# Patient Record
Sex: FEMALE | Race: WHITE | URBAN - METROPOLITAN AREA
[De-identification: names, ages, dates, MRNs, and addresses within clinical notes are randomized per-mention and may not be internally consistent; named-entity substitution may affect disease eponyms.]

---

## 2020-08-27 ENCOUNTER — OFFICE VISIT (OUTPATIENT)
Dept: URBAN - METROPOLITAN AREA CLINIC 25 | Facility: CLINIC | Age: 50
End: 2020-08-27
Payer: COMMERCIAL

## 2020-08-27 DIAGNOSIS — R11.0 NAUSEA: ICD-10-CM

## 2020-08-27 DIAGNOSIS — R10.13 EPIGASTRIC PAIN: ICD-10-CM

## 2020-08-27 PROCEDURE — G9903 PT SCRN TBCO ID AS NON USER: HCPCS | Performed by: INTERNAL MEDICINE

## 2020-08-27 PROCEDURE — 99204 OFFICE O/P NEW MOD 45 MIN: CPT | Performed by: INTERNAL MEDICINE

## 2020-08-27 PROCEDURE — G8420 CALC BMI NORM PARAMETERS: HCPCS | Performed by: INTERNAL MEDICINE

## 2020-08-27 PROCEDURE — 3017F COLORECTAL CA SCREEN DOC REV: CPT | Performed by: INTERNAL MEDICINE

## 2020-08-27 PROCEDURE — G8427 DOCREV CUR MEDS BY ELIG CLIN: HCPCS | Performed by: INTERNAL MEDICINE

## 2020-08-27 NOTE — HPI-TODAY'S VISIT:
This is a 51 yo female with history of asthma who presents with c/o of epigastric pain x 1 month. Pt mid-sternal and epigastric pain x 1 month. She states pain occassionally radiates to back at times. She reports anoerexia and nausea. Denies vomiting. She report 5 lb weight loss in 1 month. She has been using pepcid and gas-x which helps. Denies frequent NSAID use. Denies dysphagia, odyophagia.  No prior EGD or colonoscopy. No recent CT. She saw cardiology and told unremarkable EKG. She also had MRI of back - awaiting results. No family hx of GI malignancies. She voices no other concerns at this time.

## 2020-09-02 ENCOUNTER — TELEPHONE ENCOUNTER (OUTPATIENT)
Dept: URBAN - METROPOLITAN AREA CLINIC 92 | Facility: CLINIC | Age: 50
End: 2020-09-02

## 2020-09-02 ENCOUNTER — OFFICE VISIT (OUTPATIENT)
Dept: URBAN - METROPOLITAN AREA SURGERY CENTER 20 | Facility: SURGERY CENTER | Age: 50
End: 2020-09-02
Payer: COMMERCIAL

## 2020-09-02 ENCOUNTER — CLAIMS CREATED FROM THE CLAIM WINDOW (OUTPATIENT)
Dept: URBAN - METROPOLITAN AREA CLINIC 4 | Facility: CLINIC | Age: 50
End: 2020-09-02
Payer: COMMERCIAL

## 2020-09-02 DIAGNOSIS — R07.89 ACUTE CHEST WALL PAIN: ICD-10-CM

## 2020-09-02 DIAGNOSIS — R10.13 ABDOMINAL DISCOMFORT, EPIGASTRIC: ICD-10-CM

## 2020-09-02 DIAGNOSIS — R11.0 FUNCTIONAL NAUSEA: ICD-10-CM

## 2020-09-02 DIAGNOSIS — K22.8 OTHER SPECIFIED DISEASES OF ESOPHAGUS: ICD-10-CM

## 2020-09-02 PROCEDURE — 43239 EGD BIOPSY SINGLE/MULTIPLE: CPT | Performed by: INTERNAL MEDICINE

## 2020-09-02 PROCEDURE — G8907 PT DOC NO EVENTS ON DISCHARG: HCPCS | Performed by: INTERNAL MEDICINE

## 2020-09-02 PROCEDURE — 88305 TISSUE EXAM BY PATHOLOGIST: CPT | Performed by: PATHOLOGY

## 2020-09-02 RX ORDER — CYPROHEPTADINE HYDROCHLORIDE 4 MG/1
1 TABLET TABLET ORAL ONCE A DAY
Qty: 30 TABLET | Refills: 2 | OUTPATIENT
Start: 2020-09-02

## 2020-09-03 ENCOUNTER — TELEPHONE ENCOUNTER (OUTPATIENT)
Dept: URBAN - METROPOLITAN AREA CLINIC 84 | Facility: CLINIC | Age: 50
End: 2020-09-03

## 2020-09-04 ENCOUNTER — TELEPHONE ENCOUNTER (OUTPATIENT)
Dept: URBAN - METROPOLITAN AREA CLINIC 84 | Facility: CLINIC | Age: 50
End: 2020-09-04

## 2020-09-09 ENCOUNTER — WEB ENCOUNTER (OUTPATIENT)
Dept: URBAN - METROPOLITAN AREA CLINIC 25 | Facility: CLINIC | Age: 50
End: 2020-09-09

## 2020-09-11 ENCOUNTER — TELEPHONE ENCOUNTER (OUTPATIENT)
Dept: URBAN - METROPOLITAN AREA CLINIC 84 | Facility: CLINIC | Age: 50
End: 2020-09-11

## 2020-09-25 ENCOUNTER — OFFICE VISIT (OUTPATIENT)
Dept: URBAN - METROPOLITAN AREA CLINIC 84 | Facility: CLINIC | Age: 50
End: 2020-09-25
Payer: COMMERCIAL

## 2020-09-25 DIAGNOSIS — R10.11 ABDOMINAL PAIN: ICD-10-CM

## 2020-09-25 DIAGNOSIS — R14.1 BLOATING AND GAS PAIN: ICD-10-CM

## 2020-09-25 DIAGNOSIS — F45.8 GLOBUS SENSATION: ICD-10-CM

## 2020-09-25 PROCEDURE — G8420 CALC BMI NORM PARAMETERS: HCPCS | Performed by: INTERNAL MEDICINE

## 2020-09-25 PROCEDURE — 3017F COLORECTAL CA SCREEN DOC REV: CPT | Performed by: INTERNAL MEDICINE

## 2020-09-25 PROCEDURE — 99213 OFFICE O/P EST LOW 20 MIN: CPT | Performed by: INTERNAL MEDICINE

## 2020-09-25 PROCEDURE — 1036F TOBACCO NON-USER: CPT | Performed by: INTERNAL MEDICINE

## 2020-09-25 PROCEDURE — G8427 DOCREV CUR MEDS BY ELIG CLIN: HCPCS | Performed by: INTERNAL MEDICINE

## 2020-09-25 RX ORDER — POLYETHYLENE GLYCOL 3350 17 G/17G
AS DIRECTED POWDER, FOR SOLUTION ORAL ONCE A DAY
Qty: 238 GRAM | Refills: 0 | OUTPATIENT
Start: 2020-09-25

## 2020-09-25 RX ORDER — SIMETHICONE 250 MG/1
1 CAPSULE AFTER MEALS AND AT BEDTIME AS NEEDED CAPSULE, GELATIN COATED ORAL TWICE A DAY
Qty: 60 CAPSULE | Refills: 3 | OUTPATIENT
Start: 2020-09-25

## 2020-09-25 RX ORDER — HYOSCYAMINE SULFATE 0.12 MG/1
TAKE 1 TABLET (0.125 MG) BY ORAL ROUTE 3 TIMES PER DAY FOR 30 DAYS TABLET ORAL
Qty: 90 | Refills: 3 | OUTPATIENT

## 2020-09-25 RX ORDER — CYPROHEPTADINE HYDROCHLORIDE 4 MG/1
1 TABLET TABLET ORAL ONCE A DAY
Qty: 30 TABLET | Refills: 2 | Status: ACTIVE | COMMUNITY
Start: 2020-09-02

## 2020-09-25 NOTE — HPI-TODAY'S VISIT:
Patient present for f/u with new sxs of epigastric pain and bloating/gas and globus sensation with abdominal discomfort. Her ecent MRI revealed "pinched" nerve in the cervical spine per pt. Patient stated cyproheptadine has been helpful for weight gain and appetite. Patient denies etoh use and has hx of gallbladder removal. She stated she had labs recently with her primary MD and no mention of liver or pancreas issues.

## 2020-09-29 ENCOUNTER — TELEPHONE ENCOUNTER (OUTPATIENT)
Dept: URBAN - METROPOLITAN AREA CLINIC 84 | Facility: CLINIC | Age: 50
End: 2020-09-29

## 2020-10-06 ENCOUNTER — TELEPHONE ENCOUNTER (OUTPATIENT)
Dept: URBAN - METROPOLITAN AREA CLINIC 92 | Facility: CLINIC | Age: 50
End: 2020-10-06

## 2020-10-06 ENCOUNTER — WEB ENCOUNTER (OUTPATIENT)
Dept: URBAN - METROPOLITAN AREA CLINIC 84 | Facility: CLINIC | Age: 50
End: 2020-10-06

## 2020-10-19 ENCOUNTER — TELEPHONE ENCOUNTER (OUTPATIENT)
Dept: URBAN - METROPOLITAN AREA CLINIC 84 | Facility: CLINIC | Age: 50
End: 2020-10-19

## 2020-11-03 ENCOUNTER — OFFICE VISIT (OUTPATIENT)
Dept: URBAN - METROPOLITAN AREA CLINIC 25 | Facility: CLINIC | Age: 50
End: 2020-11-03
Payer: COMMERCIAL

## 2020-11-03 DIAGNOSIS — R09.89 GLOBUS PHARYNGEUS: ICD-10-CM

## 2020-11-03 DIAGNOSIS — R13.10 DYSPHAGIA: ICD-10-CM

## 2020-11-03 PROCEDURE — 1036F TOBACCO NON-USER: CPT | Performed by: INTERNAL MEDICINE

## 2020-11-03 PROCEDURE — G8427 DOCREV CUR MEDS BY ELIG CLIN: HCPCS | Performed by: INTERNAL MEDICINE

## 2020-11-03 PROCEDURE — 3017F COLORECTAL CA SCREEN DOC REV: CPT | Performed by: INTERNAL MEDICINE

## 2020-11-03 PROCEDURE — G8418 CALC BMI BLW LOW PARAM F/U: HCPCS | Performed by: INTERNAL MEDICINE

## 2020-11-03 PROCEDURE — 99214 OFFICE O/P EST MOD 30 MIN: CPT | Performed by: INTERNAL MEDICINE

## 2020-11-03 RX ORDER — HYOSCYAMINE SULFATE 0.12 MG/1
TAKE 1 TABLET (0.125 MG) BY ORAL ROUTE 3 TIMES PER DAY FOR 30 DAYS TABLET ORAL
Qty: 90 | Refills: 3 | Status: ACTIVE | COMMUNITY

## 2020-11-03 RX ORDER — POLYETHYLENE GLYCOL 3350 17 G/17G
AS DIRECTED POWDER, FOR SOLUTION ORAL ONCE A DAY
Qty: 238 GRAM | Refills: 0 | Status: ACTIVE | COMMUNITY
Start: 2020-09-25

## 2020-11-03 RX ORDER — CYPROHEPTADINE HYDROCHLORIDE 4 MG/1
1 TABLET TABLET ORAL ONCE A DAY
Qty: 30 TABLET | Refills: 2 | Status: ACTIVE | COMMUNITY
Start: 2020-09-02

## 2020-11-03 RX ORDER — SIMETHICONE 250 MG/1
1 CAPSULE AFTER MEALS AND AT BEDTIME AS NEEDED CAPSULE, GELATIN COATED ORAL TWICE A DAY
Qty: 60 CAPSULE | Refills: 3 | Status: ACTIVE | COMMUNITY
Start: 2020-09-25

## 2020-11-03 RX ORDER — HYOSCYAMINE SULFATE 0.12 MG/1
TAKE 1 TABLET (0.125 MG) BY ORAL ROUTE 3 TIMES PER DAY FOR 30 DAYS TABLET ORAL
Qty: 90 | Refills: 3

## 2020-11-03 NOTE — HPI-TODAY'S VISIT:
Patient present for f/u globus/dysphagia s/p normal egd. Pt nonetheless continues to c/o of ongoing sxs.

## 2020-12-11 ENCOUNTER — OFFICE VISIT (OUTPATIENT)
Dept: URBAN - METROPOLITAN AREA MEDICAL CENTER 28 | Facility: MEDICAL CENTER | Age: 50
End: 2020-12-11

## 2020-12-31 ENCOUNTER — OFFICE VISIT (OUTPATIENT)
Dept: URBAN - METROPOLITAN AREA CLINIC 25 | Facility: CLINIC | Age: 50
End: 2020-12-31

## 2021-01-25 ENCOUNTER — OFFICE VISIT (OUTPATIENT)
Dept: URBAN - METROPOLITAN AREA CLINIC 84 | Facility: CLINIC | Age: 51
End: 2021-01-25

## 2021-05-28 ENCOUNTER — OFFICE VISIT (OUTPATIENT)
Dept: URBAN - METROPOLITAN AREA CLINIC 84 | Facility: CLINIC | Age: 51
End: 2021-05-28
Payer: COMMERCIAL

## 2021-05-28 ENCOUNTER — WEB ENCOUNTER (OUTPATIENT)
Dept: URBAN - METROPOLITAN AREA CLINIC 84 | Facility: CLINIC | Age: 51
End: 2021-05-28

## 2021-05-28 VITALS
HEIGHT: 61 IN | WEIGHT: 116 LBS | BODY MASS INDEX: 21.9 KG/M2 | DIASTOLIC BLOOD PRESSURE: 66 MMHG | HEART RATE: 81 BPM | TEMPERATURE: 98 F | SYSTOLIC BLOOD PRESSURE: 110 MMHG

## 2021-05-28 DIAGNOSIS — R14.1 BLOATING AND GAS PAIN: ICD-10-CM

## 2021-05-28 DIAGNOSIS — R10.11 ABDOMINAL PAIN: ICD-10-CM

## 2021-05-28 DIAGNOSIS — F45.8 GLOBUS SENSATION: ICD-10-CM

## 2021-05-28 DIAGNOSIS — R13.10 DYSPHAGIA: ICD-10-CM

## 2021-05-28 DIAGNOSIS — K44.9 HIATAL HERNIA: ICD-10-CM

## 2021-05-28 PROBLEM — 40739000 DYSPHAGIA: Status: ACTIVE | Noted: 2020-11-03

## 2021-05-28 PROCEDURE — 99213 OFFICE O/P EST LOW 20 MIN: CPT | Performed by: INTERNAL MEDICINE

## 2021-05-28 RX ORDER — HYOSCYAMINE SULFATE 0.12 MG/1
TAKE 1 TABLET (0.125 MG) BY ORAL ROUTE 3 TIMES PER DAY FOR 30 DAYS TABLET ORAL
Qty: 90 | Refills: 3 | Status: ACTIVE | COMMUNITY

## 2021-05-28 RX ORDER — HYOSCYAMINE SULFATE 0.38 MG/1
1 TABLET TABLET ORAL
Qty: 60 | OUTPATIENT
Start: 2021-05-28 | End: 2021-06-27

## 2021-05-28 RX ORDER — POLYETHYLENE GLYCOL 3350 17 G/17G
AS DIRECTED POWDER, FOR SOLUTION ORAL ONCE A DAY
Qty: 238 GRAM | Refills: 0 | Status: ACTIVE | COMMUNITY
Start: 2020-09-25

## 2021-05-28 RX ORDER — CYPROHEPTADINE HYDROCHLORIDE 4 MG/1
1 TABLET TABLET ORAL ONCE A DAY
Qty: 30 TABLET | Refills: 2 | Status: ACTIVE | COMMUNITY
Start: 2020-09-02

## 2021-05-28 RX ORDER — SIMETHICONE 250 MG/1
1 CAPSULE AFTER MEALS AND AT BEDTIME AS NEEDED CAPSULE, GELATIN COATED ORAL TWICE A DAY
Qty: 60 CAPSULE | Refills: 3 | Status: ACTIVE | COMMUNITY
Start: 2020-09-25

## 2021-05-28 NOTE — HPI-TODAY'S VISIT:
52 yo pt present for f/u of globus which has improved. She could not tolerate the esophageal manometry. Review of barium swallow showed moderate hiatal hernia with reflux. Sxs have improved. CT scan recently was normal. Evaluation by neurologist has been unremarkable by pt. She states that a recent colonoscopy has been normal.

## 2021-06-08 ENCOUNTER — TELEPHONE ENCOUNTER (OUTPATIENT)
Dept: URBAN - METROPOLITAN AREA CLINIC 92 | Facility: CLINIC | Age: 51
End: 2021-06-08

## 2021-06-08 RX ORDER — CYPROHEPTADINE HYDROCHLORIDE 4 MG/1
1 TABLET TABLET ORAL ONCE A DAY
Qty: 30 TABLET | Refills: 2 | Status: ACTIVE | COMMUNITY
Start: 2020-09-02

## 2021-06-08 RX ORDER — DICYCLOMINE HYDROCHLORIDE 20 MG/1
TAKE 1 CAPSULE (10 MG) BY ORAL ROUTE 3 TIMES PER DAY FOR 30 DAYS TABLET ORAL
Qty: 180 CAPSULE | Refills: 2 | OUTPATIENT

## 2021-06-08 RX ORDER — HYOSCYAMINE SULFATE 0.38 MG/1
1 TABLET TABLET ORAL
OUTPATIENT
Start: 2021-05-28 | End: 2021-06-27

## 2021-06-08 RX ORDER — POLYETHYLENE GLYCOL 3350 17 G/17G
AS DIRECTED POWDER, FOR SOLUTION ORAL ONCE A DAY
Qty: 238 GRAM | Refills: 0 | Status: ACTIVE | COMMUNITY
Start: 2020-09-25

## 2021-06-08 RX ORDER — HYOSCYAMINE SULFATE 0.38 MG/1
1 TABLET TABLET ORAL
Qty: 60 | Status: ACTIVE | COMMUNITY
Start: 2021-05-28 | End: 2021-06-27

## 2021-06-08 RX ORDER — SIMETHICONE 250 MG/1
1 CAPSULE AFTER MEALS AND AT BEDTIME AS NEEDED CAPSULE, GELATIN COATED ORAL TWICE A DAY
Qty: 60 CAPSULE | Refills: 3 | Status: ACTIVE | COMMUNITY
Start: 2020-09-25

## 2021-06-08 RX ORDER — HYOSCYAMINE SULFATE 0.12 MG/1
TAKE 1 TABLET (0.125 MG) BY ORAL ROUTE 3 TIMES PER DAY FOR 30 DAYS TABLET ORAL
Qty: 90 | Refills: 3 | Status: ACTIVE | COMMUNITY

## 2021-06-21 ENCOUNTER — TELEPHONE ENCOUNTER (OUTPATIENT)
Dept: URBAN - METROPOLITAN AREA CLINIC 92 | Facility: CLINIC | Age: 51
End: 2021-06-21

## 2021-06-21 ENCOUNTER — OFFICE VISIT (OUTPATIENT)
Dept: URBAN - METROPOLITAN AREA CLINIC 29 | Facility: CLINIC | Age: 51
End: 2021-06-21

## 2021-06-21 PROBLEM — 14760008 CONSTIPATION: Status: ACTIVE | Noted: 2021-06-21

## 2021-06-21 PROBLEM — 29857009 CHEST PAIN: Status: ACTIVE | Noted: 2021-06-21

## 2021-06-21 PROBLEM — 271832001 FLATULENCE, ERUCTATION AND GAS PAIN: Status: ACTIVE | Noted: 2021-06-21

## 2021-06-21 PROBLEM — 266435005 GASTRO-ESOPHAGEAL REFLUX DISEASE WITHOUT ESOPHAGITIS: Status: ACTIVE | Noted: 2021-06-21

## 2021-11-08 ENCOUNTER — DASHBOARD ENCOUNTERS (OUTPATIENT)
Age: 51
End: 2021-11-08

## 2021-11-08 ENCOUNTER — WEB ENCOUNTER (OUTPATIENT)
Dept: URBAN - METROPOLITAN AREA CLINIC 84 | Facility: CLINIC | Age: 51
End: 2021-11-08

## 2021-11-08 ENCOUNTER — OFFICE VISIT (OUTPATIENT)
Dept: URBAN - METROPOLITAN AREA CLINIC 84 | Facility: CLINIC | Age: 51
End: 2021-11-08
Payer: COMMERCIAL

## 2021-11-08 VITALS
DIASTOLIC BLOOD PRESSURE: 66 MMHG | HEIGHT: 61 IN | HEART RATE: 82 BPM | BODY MASS INDEX: 21.9 KG/M2 | SYSTOLIC BLOOD PRESSURE: 100 MMHG | TEMPERATURE: 98.1 F | WEIGHT: 116 LBS

## 2021-11-08 DIAGNOSIS — R14.1 BLOATING AND GAS PAIN: ICD-10-CM

## 2021-11-08 DIAGNOSIS — R10.11 ABDOMINAL PAIN: ICD-10-CM

## 2021-11-08 DIAGNOSIS — R10.13 EPIGASTRIC PAIN: ICD-10-CM

## 2021-11-08 DIAGNOSIS — R11.0 NAUSEA: ICD-10-CM

## 2021-11-08 PROBLEM — 422587007: Status: ACTIVE | Noted: 2021-11-08

## 2021-11-08 PROBLEM — 79922009: Status: ACTIVE | Noted: 2021-11-08

## 2021-11-08 PROBLEM — 271832001 FLATULENCE, ERUCTATION AND GAS PAIN: Status: ACTIVE | Noted: 2021-11-08

## 2021-11-08 PROBLEM — 21522001 ABDOMINAL PAIN: Status: ACTIVE | Noted: 2021-11-08

## 2021-11-08 PROCEDURE — 99214 OFFICE O/P EST MOD 30 MIN: CPT | Performed by: INTERNAL MEDICINE

## 2021-11-08 RX ORDER — LUBIPROSTONE 24 UG/1
1 CAPSULE WITH FOOD AND WATER CAPSULE, GELATIN COATED ORAL TWICE A DAY
Qty: 60 CAPSULE | Refills: 4 | OUTPATIENT
Start: 2021-11-08 | End: 2022-04-07

## 2021-11-08 RX ORDER — DICYCLOMINE HYDROCHLORIDE 20 MG/1
1 TABLET TABLET ORAL THREE TIMES A DAY
Qty: 90 TABLET | Refills: 3 | OUTPATIENT
Start: 2021-11-08 | End: 2022-03-08

## 2021-11-08 RX ORDER — CYPROHEPTADINE HYDROCHLORIDE 4 MG/1
1 TABLET TABLET ORAL ONCE A DAY
Qty: 30 TABLET | Refills: 2 | Status: ON HOLD | COMMUNITY
Start: 2020-09-02

## 2021-11-08 RX ORDER — SIMETHICONE 250 MG/1
1 CAPSULE AFTER MEALS AND AT BEDTIME AS NEEDED CAPSULE, GELATIN COATED ORAL TWICE A DAY
Qty: 60 CAPSULE | Refills: 3 | Status: ON HOLD | COMMUNITY
Start: 2020-09-25

## 2021-11-08 RX ORDER — HYOSCYAMINE SULFATE 0.12 MG/1
TAKE 1 TABLET (0.125 MG) BY ORAL ROUTE 3 TIMES PER DAY FOR 30 DAYS TABLET ORAL
Qty: 90 | Refills: 3 | Status: ON HOLD | COMMUNITY

## 2021-11-08 RX ORDER — CALCIUM CARBONATE 500 MG/1
1 TABLET TABLET ORAL ONCE A DAY
Status: ACTIVE | COMMUNITY

## 2021-11-08 RX ORDER — DICYCLOMINE HYDROCHLORIDE 20 MG/1
TAKE 1 CAPSULE (10 MG) BY ORAL ROUTE 3 TIMES PER DAY FOR 30 DAYS TABLET ORAL
Qty: 180 CAPSULE | Refills: 2 | Status: ON HOLD | COMMUNITY

## 2021-11-08 RX ORDER — POLYETHYLENE GLYCOL 3350 17 G/17G
AS DIRECTED POWDER, FOR SOLUTION ORAL ONCE A DAY
Qty: 238 GRAM | Refills: 0 | Status: ON HOLD | COMMUNITY
Start: 2020-09-25

## 2021-11-08 NOTE — HPI-TODAY'S VISIT:
52 yo pt presents for f/u of abd pain with back pain/llq pain and LLE pain after meals. Dicyclomine improved her sxs. Left leg pain is triggered by eating or drinking fluids. BM's improves her leg pain and abdominal pain.

## 2021-12-06 ENCOUNTER — OFFICE VISIT (OUTPATIENT)
Dept: URBAN - METROPOLITAN AREA CLINIC 84 | Facility: CLINIC | Age: 51
End: 2021-12-06

## 2022-04-30 ENCOUNTER — TELEPHONE ENCOUNTER (OUTPATIENT)
Dept: URBAN - METROPOLITAN AREA CLINIC 121 | Facility: CLINIC | Age: 52
End: 2022-04-30

## 2022-05-01 ENCOUNTER — TELEPHONE ENCOUNTER (OUTPATIENT)
Dept: URBAN - METROPOLITAN AREA CLINIC 121 | Facility: CLINIC | Age: 52
End: 2022-05-01

## 2022-05-01 RX ORDER — FAMOTIDINE 20 MG
TABLET ORAL
Status: ACTIVE | COMMUNITY
Start: 2021-06-21

## 2022-05-01 RX ORDER — SIMETHICONE 125 MG/1
CAPSULE, LIQUID FILLED ORAL
Status: ACTIVE | COMMUNITY
Start: 2021-06-21

## 2022-05-01 RX ORDER — CALCIUM CARBONATE 500 MG/1
TABLET, CHEWABLE ORAL
Status: ACTIVE | COMMUNITY
Start: 2021-06-21

## 2022-05-01 RX ORDER — LINACLOTIDE 290 UG/1
1 CAPSULE PO QAM 30 MINUTES BEFORE FIRST MEAL CAPSULE, GELATIN COATED ORAL
Status: ACTIVE | COMMUNITY
Start: 2021-06-22

## 2022-05-01 RX ORDER — CAMPHOR 0.45 %
GEL (GRAM) TOPICAL
Status: ACTIVE | COMMUNITY
Start: 2021-06-21

## 2024-09-09 ENCOUNTER — OFFICE VISIT (OUTPATIENT)
Dept: URBAN - METROPOLITAN AREA CLINIC 98 | Facility: CLINIC | Age: 54
End: 2024-09-09

## 2024-10-11 NOTE — PHYSICAL EXAM CONSTITUTIONAL:
pleasant, well nourished, well developed, in no acute distress , normal communication ability Attending Attestation (For Attendings USE Only)...

## 2024-10-29 ENCOUNTER — OFFICE VISIT (OUTPATIENT)
Dept: URBAN - METROPOLITAN AREA CLINIC 98 | Facility: CLINIC | Age: 54
End: 2024-10-29

## 2024-12-13 ENCOUNTER — LAB OUTSIDE AN ENCOUNTER (OUTPATIENT)
Dept: URBAN - METROPOLITAN AREA CLINIC 98 | Facility: CLINIC | Age: 54
End: 2024-12-13

## 2024-12-13 ENCOUNTER — OFFICE VISIT (OUTPATIENT)
Dept: URBAN - METROPOLITAN AREA CLINIC 98 | Facility: CLINIC | Age: 54
End: 2024-12-13
Payer: COMMERCIAL

## 2024-12-13 VITALS
HEART RATE: 74 BPM | SYSTOLIC BLOOD PRESSURE: 108 MMHG | BODY MASS INDEX: 22.47 KG/M2 | DIASTOLIC BLOOD PRESSURE: 72 MMHG | TEMPERATURE: 97.2 F | HEIGHT: 61 IN | WEIGHT: 119 LBS

## 2024-12-13 DIAGNOSIS — Z12.11 COLON CANCER SCREENING: ICD-10-CM

## 2024-12-13 DIAGNOSIS — R10.9 ABDOMINAL PAIN, LEFT LATERAL: ICD-10-CM

## 2024-12-13 PROCEDURE — 99203 OFFICE O/P NEW LOW 30 MIN: CPT | Performed by: INTERNAL MEDICINE

## 2024-12-13 RX ORDER — CAMPHOR 0.45 %
GEL (GRAM) TOPICAL
Status: ON HOLD | COMMUNITY
Start: 2021-06-21

## 2024-12-13 RX ORDER — FAMOTIDINE 20 MG/1
TABLET, FILM COATED ORAL
Status: ON HOLD | COMMUNITY
Start: 2021-06-21

## 2024-12-13 RX ORDER — DICYCLOMINE HYDROCHLORIDE 20 MG/1
TAKE 1 CAPSULE (10 MG) BY ORAL ROUTE 3 TIMES PER DAY FOR 30 DAYS TABLET ORAL
Qty: 180 CAPSULE | Refills: 2 | Status: ON HOLD | COMMUNITY

## 2024-12-13 RX ORDER — SIMETHICONE 125 MG/1
CAPSULE, LIQUID FILLED ORAL
Status: ON HOLD | COMMUNITY
Start: 2021-06-21

## 2024-12-13 RX ORDER — CYPROHEPTADINE HYDROCHLORIDE 4 MG/1
1 TABLET TABLET ORAL ONCE A DAY
Qty: 30 TABLET | Refills: 2 | Status: ON HOLD | COMMUNITY
Start: 2020-09-02

## 2024-12-13 RX ORDER — POLYETHYLENE GLYCOL 3350 17 G/17G
AS DIRECTED POWDER, FOR SOLUTION ORAL ONCE A DAY
Qty: 238 GRAM | Refills: 0 | Status: ON HOLD | COMMUNITY
Start: 2020-09-25

## 2024-12-13 RX ORDER — CALCIUM CARBONATE 500 MG/1
1 TABLET TABLET ORAL ONCE A DAY
Status: ON HOLD | COMMUNITY

## 2024-12-13 RX ORDER — HYOSCYAMINE SULFATE 0.12 MG/1
TAKE 1 TABLET (0.125 MG) BY ORAL ROUTE 3 TIMES PER DAY FOR 30 DAYS TABLET ORAL
Qty: 90 | Refills: 3 | Status: ON HOLD | COMMUNITY

## 2024-12-13 RX ORDER — LINACLOTIDE 290 UG/1
1 CAPSULE PO QAM 30 MINUTES BEFORE FIRST MEAL CAPSULE, GELATIN COATED ORAL
Status: ON HOLD | COMMUNITY
Start: 2021-06-22

## 2024-12-13 RX ORDER — SIMETHICONE 250 MG/1
1 CAPSULE AFTER MEALS AND AT BEDTIME AS NEEDED CAPSULE, GELATIN COATED ORAL TWICE A DAY
Qty: 60 CAPSULE | Refills: 3 | Status: ON HOLD | COMMUNITY
Start: 2020-09-25

## 2024-12-13 RX ORDER — CALCIUM CARBONATE 500 MG/1
TABLET, CHEWABLE ORAL
Status: ON HOLD | COMMUNITY
Start: 2021-06-21

## 2024-12-13 NOTE — HPI-TODAY'S VISIT:
Pt of Dr Baez  4 years  has been working with him on pain left side upper and lower : after eating or drinking  and lasts a long time  even water burns here to discuss cysts and pancreas   mri 2021 [facility] : hepatic cysts  stomach mildly distended with fluid and debris; moderate stool   always been slow

## 2024-12-24 ENCOUNTER — TELEPHONE ENCOUNTER (OUTPATIENT)
Dept: URBAN - METROPOLITAN AREA CLINIC 98 | Facility: CLINIC | Age: 54
End: 2024-12-24

## 2025-01-03 ENCOUNTER — OFFICE VISIT (OUTPATIENT)
Dept: URBAN - METROPOLITAN AREA SURGERY CENTER 18 | Facility: SURGERY CENTER | Age: 55
End: 2025-01-03

## 2025-02-24 ENCOUNTER — TELEPHONE ENCOUNTER (OUTPATIENT)
Dept: URBAN - METROPOLITAN AREA CLINIC 23 | Facility: CLINIC | Age: 55
End: 2025-02-24